# Patient Record
Sex: MALE | Employment: UNEMPLOYED | URBAN - NONMETROPOLITAN AREA
[De-identification: names, ages, dates, MRNs, and addresses within clinical notes are randomized per-mention and may not be internally consistent; named-entity substitution may affect disease eponyms.]

---

## 2024-03-25 ENCOUNTER — SOCIAL WORK (OUTPATIENT)
Dept: INTERNAL MEDICINE CLINIC | Age: 44
End: 2024-03-25

## 2024-03-25 NOTE — PROGRESS NOTES
Patient contacted office to complete new patient screening. He was referred by SALOME Koehler ( [facility] patient).  Patient is new to treatment, though has been in half way houses due to incarceration. Patient reports using 1/2-1 gram of heroin( IV) daily. Last use reported 3/23 early morning.  Patient report an o/d two years ago, was narcanned.  Patient reports starting use was at age 24. Reports that he uses THC.  No current employment but does have transportation.  Patient would be an induction.  Sw staffed with provider who agreed to see patient tomorrow at 1p.

## 2024-03-26 ENCOUNTER — OFFICE VISIT (OUTPATIENT)
Dept: INTERNAL MEDICINE CLINIC | Age: 44
End: 2024-03-26
Payer: MEDICAID

## 2024-03-26 VITALS
HEART RATE: 80 BPM | WEIGHT: 240 LBS | SYSTOLIC BLOOD PRESSURE: 138 MMHG | BODY MASS INDEX: 29.84 KG/M2 | HEIGHT: 75 IN | DIASTOLIC BLOOD PRESSURE: 72 MMHG

## 2024-03-26 DIAGNOSIS — F11.20 SEVERE OPIOID USE DISORDER (HCC): Primary | ICD-10-CM

## 2024-03-26 DIAGNOSIS — F33.9 EPISODE OF RECURRENT MAJOR DEPRESSIVE DISORDER, UNSPECIFIED DEPRESSION EPISODE SEVERITY (HCC): ICD-10-CM

## 2024-03-26 DIAGNOSIS — F43.10 PTSD (POST-TRAUMATIC STRESS DISORDER): ICD-10-CM

## 2024-03-26 LAB
ALCOHOL URINE: ABNORMAL
AMPHETAMINE SCREEN, URINE: ABNORMAL
BARBITURATE SCREEN, URINE: ABNORMAL
BENZODIAZEPINE SCREEN, URINE: ABNORMAL
BUPRENORPHINE URINE: ABNORMAL
COCAINE METABOLITE SCREEN URINE: ABNORMAL
FENTANYL SCREEN, URINE: ABNORMAL
GABAPENTIN SCREEN, URINE: ABNORMAL
MDMA URINE: ABNORMAL
METHADONE SCREEN, URINE: ABNORMAL
METHAMPHETAMINE, URINE: ABNORMAL
OPIATE SCREEN URINE: ABNORMAL
OXYCODONE SCREEN URINE: ABNORMAL
PHENCYCLIDINE SCREEN URINE: ABNORMAL
PROPOXYPHENE SCREEN, URINE: ABNORMAL
SYNTHETIC CANNABINOIDS(K2) SCREEN, URINE: ABNORMAL
THC SCREEN, URINE: ABNORMAL
TRAMADOL SCREEN URINE: ABNORMAL
TRICYCLIC ANTIDEPRESSANTS, UR: ABNORMAL

## 2024-03-26 PROCEDURE — 80305 DRUG TEST PRSMV DIR OPT OBS: CPT | Performed by: NURSE PRACTITIONER

## 2024-03-26 PROCEDURE — 99204 OFFICE O/P NEW MOD 45 MIN: CPT | Performed by: NURSE PRACTITIONER

## 2024-03-26 RX ORDER — BUPRENORPHINE AND NALOXONE 8; 2 MG/1; MG/1
1 FILM, SOLUBLE BUCCAL; SUBLINGUAL 2 TIMES DAILY
Qty: 6 FILM | Refills: 0 | Status: SHIPPED | OUTPATIENT
Start: 2024-03-26 | End: 2024-03-28 | Stop reason: SDUPTHER

## 2024-03-26 RX ORDER — QUETIAPINE FUMARATE 50 MG/1
50 TABLET, EXTENDED RELEASE ORAL NIGHTLY
Qty: 30 TABLET | Refills: 0 | Status: SHIPPED | OUTPATIENT
Start: 2024-03-26 | End: 2024-04-25

## 2024-03-26 RX ORDER — QUETIAPINE FUMARATE 50 MG/1
50 TABLET, EXTENDED RELEASE ORAL NIGHTLY
COMMUNITY
End: 2024-03-26 | Stop reason: SDUPTHER

## 2024-03-26 ASSESSMENT — ENCOUNTER SYMPTOMS
CHEST TIGHTNESS: 0
WHEEZING: 0
SHORTNESS OF BREATH: 0
VOMITING: 0
ABDOMINAL PAIN: 0
CONSTIPATION: 0
COUGH: 0
NAUSEA: 0
DIARRHEA: 0

## 2024-03-26 NOTE — PROGRESS NOTES
03/27/24   The patients primary provider is None, None    Martin García is a 43 y.o.  male who presents in office today for medication assisted treatment.   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   Charged with F3 and F5 in 2019- was incarcerated for 3 years. He occasionally took suboxone while incarcerated, no opiate use  He is not on parole  Quantity used daily: 1/2-1 gram daily   Route of administration: iv  Date and time of last use: 3/25/24 11pm   Othersubstances: THC, occasional psychodelic  Prior attempts at quitting: none  Prior uses of buprenorphine/naltrexone: he has taken suboxone on the street but never prescribed to him. Took 6mg sl this am.     Pt states he has more or less taken suboxone in betweeen using heroin/fentanyl to prevent withdrawal symptoms, never for an extended period of time or consistent dose      Psychiatric history: depression, anxiety, PTSD.   States he has been diagnosed as bipolar  Was on seroquel while incarcerated, not currently taking any medication. States this seemed to work well for him  States \" I slept good, energy level and attitude was different\"     Social hx-living with a friend- this is a sober environment- his friend recommended he come here for treatment.   He is looking for employment and his own housing  He is from [facility] [place]    Pt states \" I am trying to not end up back in [place] and quit this habit\"   3 children, two are 16, and an 11 y.o    Hepatitis hx: no known history    UDS Positive for BUP FTY THC     He is interested in weekly office groups, will refer to [facility]    Reports he took 6mg suboxone this am. He does not appear to be in any state of withdrawal currently  Instructions on suboxone administration reviewed in detail.         Discussed at length all Medication Assisted Treatment options.

## 2024-03-26 NOTE — PROGRESS NOTES
Verbal order per Emily ALFARO CNP for urine drug screen. Positive for BUP FTY THC. Verified results with Kamla LOZANO LPN.

## 2024-03-27 PROBLEM — F33.9 EPISODE OF RECURRENT MAJOR DEPRESSIVE DISORDER (HCC): Status: ACTIVE | Noted: 2024-03-27

## 2024-03-27 PROBLEM — F43.10 PTSD (POST-TRAUMATIC STRESS DISORDER): Status: ACTIVE | Noted: 2024-03-27

## 2024-03-28 ENCOUNTER — TELEPHONE (OUTPATIENT)
Dept: INTERNAL MEDICINE CLINIC | Age: 44
End: 2024-03-28

## 2024-03-28 ENCOUNTER — OFFICE VISIT (OUTPATIENT)
Dept: INTERNAL MEDICINE CLINIC | Age: 44
End: 2024-03-28
Payer: MEDICAID

## 2024-03-28 VITALS
HEART RATE: 71 BPM | SYSTOLIC BLOOD PRESSURE: 137 MMHG | HEIGHT: 75 IN | WEIGHT: 240 LBS | DIASTOLIC BLOOD PRESSURE: 70 MMHG | BODY MASS INDEX: 29.84 KG/M2

## 2024-03-28 DIAGNOSIS — F11.20 SEVERE OPIOID USE DISORDER (HCC): Primary | ICD-10-CM

## 2024-03-28 PROCEDURE — 80305 DRUG TEST PRSMV DIR OPT OBS: CPT | Performed by: NURSE PRACTITIONER

## 2024-03-28 PROCEDURE — 99211 OFF/OP EST MAY X REQ PHY/QHP: CPT | Performed by: NURSE PRACTITIONER

## 2024-03-28 RX ORDER — BUPRENORPHINE AND NALOXONE 8; 2 MG/1; MG/1
1 FILM, SOLUBLE BUCCAL; SUBLINGUAL 2 TIMES DAILY
Qty: 8 FILM | Refills: 0 | OUTPATIENT
Start: 2024-03-28 | End: 2024-04-01

## 2024-03-28 SDOH — ECONOMIC STABILITY: FOOD INSECURITY: WITHIN THE PAST 12 MONTHS, THE FOOD YOU BOUGHT JUST DIDN'T LAST AND YOU DIDN'T HAVE MONEY TO GET MORE.: NEVER TRUE

## 2024-03-28 SDOH — ECONOMIC STABILITY: HOUSING INSECURITY
IN THE LAST 12 MONTHS, WAS THERE A TIME WHEN YOU DID NOT HAVE A STEADY PLACE TO SLEEP OR SLEPT IN A SHELTER (INCLUDING NOW)?: NO

## 2024-03-28 SDOH — ECONOMIC STABILITY: FOOD INSECURITY: WITHIN THE PAST 12 MONTHS, YOU WORRIED THAT YOUR FOOD WOULD RUN OUT BEFORE YOU GOT MONEY TO BUY MORE.: NEVER TRUE

## 2024-03-28 SDOH — ECONOMIC STABILITY: INCOME INSECURITY: HOW HARD IS IT FOR YOU TO PAY FOR THE VERY BASICS LIKE FOOD, HOUSING, MEDICAL CARE, AND HEATING?: NOT HARD AT ALL

## 2024-03-28 NOTE — TELEPHONE ENCOUNTER
VO per Emily RODRIGUES for Suboxone 8 mg film BID for 4 days qty 8.    LPN called in script of Suboxone 8mg film BID for 4 days qty 8 into [facility] on [address]

## 2024-03-28 NOTE — PROGRESS NOTES
Verbal order per STACEY HOOD CNP for urine drug screen. Positive for BUP, THC. Verified results with RUDOLPH RODRIGUES LPN.  Patient here for a nurse visit. Medication called into pharmacy for patient per providers verbal order.

## 2024-04-10 ENCOUNTER — SOCIAL WORK (OUTPATIENT)
Dept: INTERNAL MEDICINE CLINIC | Age: 44
End: 2024-04-10

## 2024-04-10 NOTE — PROGRESS NOTES
Patient attended [facility] today. Session 1&2 covered.  Patient missed appointment on 4/1. Sw has patient scheduled for 4/11 for a follow up.

## 2024-04-11 ENCOUNTER — OFFICE VISIT (OUTPATIENT)
Dept: INTERNAL MEDICINE CLINIC | Age: 44
End: 2024-04-11

## 2024-04-11 VITALS
SYSTOLIC BLOOD PRESSURE: 158 MMHG | HEIGHT: 75 IN | WEIGHT: 240 LBS | BODY MASS INDEX: 29.84 KG/M2 | HEART RATE: 52 BPM | DIASTOLIC BLOOD PRESSURE: 79 MMHG

## 2024-04-11 DIAGNOSIS — Z11.4 ENCOUNTER FOR SCREENING FOR HIV: ICD-10-CM

## 2024-04-11 DIAGNOSIS — Z11.59 ENCOUNTER FOR HEPATITIS C VIRUS SCREENING TEST FOR HIGH RISK PATIENT: ICD-10-CM

## 2024-04-11 DIAGNOSIS — Z91.89 ENCOUNTER FOR HEPATITIS C VIRUS SCREENING TEST FOR HIGH RISK PATIENT: ICD-10-CM

## 2024-04-11 DIAGNOSIS — F11.20 SEVERE OPIOID USE DISORDER (HCC): Primary | ICD-10-CM

## 2024-04-11 RX ORDER — BUPRENORPHINE AND NALOXONE 8; 2 MG/1; MG/1
0.5 FILM, SOLUBLE BUCCAL; SUBLINGUAL 2 TIMES DAILY
Qty: 5 FILM | Refills: 0 | Status: SHIPPED | OUTPATIENT
Start: 2024-04-11 | End: 2024-04-16

## 2024-04-11 ASSESSMENT — PATIENT HEALTH QUESTIONNAIRE - PHQ9
SUM OF ALL RESPONSES TO PHQ QUESTIONS 1-9: 3
4. FEELING TIRED OR HAVING LITTLE ENERGY: NOT AT ALL
8. MOVING OR SPEAKING SO SLOWLY THAT OTHER PEOPLE COULD HAVE NOTICED. OR THE OPPOSITE, BEING SO FIGETY OR RESTLESS THAT YOU HAVE BEEN MOVING AROUND A LOT MORE THAN USUAL: NOT AT ALL
SUM OF ALL RESPONSES TO PHQ QUESTIONS 1-9: 3
SUM OF ALL RESPONSES TO PHQ QUESTIONS 1-9: 3
7. TROUBLE CONCENTRATING ON THINGS, SUCH AS READING THE NEWSPAPER OR WATCHING TELEVISION: NOT AT ALL
9. THOUGHTS THAT YOU WOULD BE BETTER OFF DEAD, OR OF HURTING YOURSELF: NOT AT ALL
1. LITTLE INTEREST OR PLEASURE IN DOING THINGS: NEARLY EVERY DAY
SUM OF ALL RESPONSES TO PHQ QUESTIONS 1-9: 3
2. FEELING DOWN, DEPRESSED OR HOPELESS: NOT AT ALL
SUM OF ALL RESPONSES TO PHQ9 QUESTIONS 1 & 2: 3
10. IF YOU CHECKED OFF ANY PROBLEMS, HOW DIFFICULT HAVE THESE PROBLEMS MADE IT FOR YOU TO DO YOUR WORK, TAKE CARE OF THINGS AT HOME, OR GET ALONG WITH OTHER PEOPLE: NOT DIFFICULT AT ALL
3. TROUBLE FALLING OR STAYING ASLEEP: NOT AT ALL
5. POOR APPETITE OR OVEREATING: NOT AT ALL
6. FEELING BAD ABOUT YOURSELF - OR THAT YOU ARE A FAILURE OR HAVE LET YOURSELF OR YOUR FAMILY DOWN: NOT AT ALL

## 2024-04-11 NOTE — PROGRESS NOTES
Verbal order per STACEY HOOD CNP for urine drug screen. Positive for AMP, BUP, FERNANDO, MDMA, METH, THC. Verified results with Nury RODRIGUES LPN.

## 2024-04-11 NOTE — PROGRESS NOTES
04/11/24   The patients primary care physician is None, None    Martin García is a 43 y.o.  male who presents in office today for follow up medication assisted treatment, substance use disorder.   Pt was first seen on 3/26- He was last seen on 3/28- He was a no show on 4/1    Pt states \" I'm going to be dirty\" admits he used \" a little bit of meth\" 2 days ago. Route of administration was snorting.   Denies any use of opiates since 3/25/24    States he is taking 4mg suboxone bid- reports \"its working\" denies urges for opiates. States he used the meth because he was under a lot of stress   Denies buying suboxone off the streets and states has been using his last script however OOARs does not match up.     He agrees to labs- completed in office    UDS positive for amphetamines, bup, cocaine, MDMA, and thc. Pt left before providing urine however did return to office once called. He again left before I was able to go back in and discuss results.   Pt scheduled back for Monday    He is not employed- living with a friend  Not active in counseling or groups- encouraged to do so. 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   Charged with F3 and F5 in 2019- was incarcerated for 3 years. He occasionally took suboxone while incarcerated, no opiate use  He is not on parole  Quantity used daily: 1/2-1 gram daily            Route of administration: iv  Othersubstances: THC, \"occasional psychodelic\"      Past Medical History:   Diagnosis Date    Anxiety     Depression     Substance abuse (HCC)          Social History     Socioeconomic History    Marital status: Unknown     Spouse name: Not on file    Number of children: Not on file    Years of education: Not on file    Highest education level: